# Patient Record
Sex: FEMALE | Race: BLACK OR AFRICAN AMERICAN | NOT HISPANIC OR LATINO
[De-identification: names, ages, dates, MRNs, and addresses within clinical notes are randomized per-mention and may not be internally consistent; named-entity substitution may affect disease eponyms.]

---

## 2017-03-31 PROBLEM — Z00.00 ENCOUNTER FOR PREVENTIVE HEALTH EXAMINATION: Status: ACTIVE | Noted: 2017-03-31

## 2018-07-09 ENCOUNTER — FORM ENCOUNTER (OUTPATIENT)
Age: 23
End: 2018-07-09

## 2018-07-10 ENCOUNTER — OUTPATIENT (OUTPATIENT)
Dept: OUTPATIENT SERVICES | Facility: HOSPITAL | Age: 23
LOS: 1 days | End: 2018-07-10
Payer: COMMERCIAL

## 2018-07-10 ENCOUNTER — APPOINTMENT (OUTPATIENT)
Dept: RADIOLOGY | Facility: CLINIC | Age: 23
End: 2018-07-10

## 2018-07-10 ENCOUNTER — TRANSCRIPTION ENCOUNTER (OUTPATIENT)
Age: 23
End: 2018-07-10

## 2018-07-10 ENCOUNTER — APPOINTMENT (OUTPATIENT)
Dept: ORTHOPEDIC SURGERY | Facility: CLINIC | Age: 23
End: 2018-07-10
Payer: MEDICAID

## 2018-07-10 VITALS — RESPIRATION RATE: 16 BRPM | BODY MASS INDEX: 44.39 KG/M2 | WEIGHT: 260 LBS | HEIGHT: 64 IN

## 2018-07-10 DIAGNOSIS — Z86.59 PERSONAL HISTORY OF OTHER MENTAL AND BEHAVIORAL DISORDERS: ICD-10-CM

## 2018-07-10 DIAGNOSIS — M25.561 PAIN IN RIGHT KNEE: ICD-10-CM

## 2018-07-10 PROCEDURE — 73564 X-RAY EXAM KNEE 4 OR MORE: CPT | Mod: 26,RT

## 2018-07-10 PROCEDURE — 99204 OFFICE O/P NEW MOD 45 MIN: CPT

## 2018-07-10 PROCEDURE — 73564 X-RAY EXAM KNEE 4 OR MORE: CPT

## 2018-07-10 RX ORDER — LOPERAMIDE HYDROCHLORIDE 1 MG/5ML
1 SOLUTION ORAL
Qty: 118 | Refills: 0 | Status: ACTIVE | COMMUNITY
Start: 2018-02-08

## 2018-07-10 RX ORDER — SILVER SULFADIAZINE 10 MG/G
1 CREAM TOPICAL
Qty: 50 | Refills: 0 | Status: ACTIVE | COMMUNITY
Start: 2018-02-08

## 2018-07-10 RX ORDER — TRIAMCINOLONE ACETONIDE 1 MG/G
0.1 CREAM TOPICAL
Qty: 80 | Refills: 0 | Status: ACTIVE | COMMUNITY
Start: 2018-02-13

## 2018-07-10 RX ORDER — FLUOCINOLONE ACETONIDE 0.1 MG/ML
0.01 SOLUTION TOPICAL
Qty: 60 | Refills: 0 | Status: ACTIVE | COMMUNITY
Start: 2018-02-13

## 2018-07-10 RX ORDER — KETOCONAZOLE 20 MG/G
2 CREAM TOPICAL
Qty: 60 | Refills: 0 | Status: ACTIVE | COMMUNITY
Start: 2018-06-25

## 2018-07-10 RX ORDER — BENZOYL PEROXIDE 5 G/100G
5 LIQUID TOPICAL
Qty: 227 | Refills: 0 | Status: ACTIVE | COMMUNITY
Start: 2018-06-28

## 2018-07-10 RX ORDER — ERYTHROMYCIN 20 MG/ML
2 SOLUTION TOPICAL
Qty: 60 | Refills: 0 | Status: ACTIVE | COMMUNITY
Start: 2018-06-28

## 2018-07-11 PROBLEM — Z86.59 HISTORY OF DEPRESSION: Status: RESOLVED | Noted: 2018-07-11 | Resolved: 2018-07-11

## 2018-07-11 PROBLEM — Z86.59 HISTORY OF ANXIETY: Status: RESOLVED | Noted: 2018-07-11 | Resolved: 2018-07-11

## 2018-07-12 ENCOUNTER — APPOINTMENT (OUTPATIENT)
Dept: ORTHOPEDIC SURGERY | Facility: CLINIC | Age: 23
End: 2018-07-12

## 2018-07-12 ENCOUNTER — TRANSCRIPTION ENCOUNTER (OUTPATIENT)
Age: 23
End: 2018-07-12

## 2018-07-13 PROBLEM — Z82.49 FAMILY HISTORY OF HYPERTENSION: Status: ACTIVE | Noted: 2018-07-13

## 2018-07-17 DIAGNOSIS — Z82.49 FAMILY HISTORY OF ISCHEMIC HEART DISEASE AND OTHER DISEASES OF THE CIRCULATORY SYSTEM: ICD-10-CM

## 2018-08-03 ENCOUNTER — OUTPATIENT (OUTPATIENT)
Dept: OUTPATIENT SERVICES | Facility: HOSPITAL | Age: 23
LOS: 1 days | Discharge: HOME | End: 2018-08-03
Payer: MEDICAID

## 2018-08-03 ENCOUNTER — APPOINTMENT (OUTPATIENT)
Dept: ORTHOPEDIC SURGERY | Facility: AMBULATORY SURGERY CENTER | Age: 23
End: 2018-08-03

## 2018-08-03 PROCEDURE — 29881 ARTHRS KNE SRG MNISECTMY M/L: CPT | Mod: RT

## 2018-08-03 PROCEDURE — 29879 ARTHRS KNE SRG ABRASJ ARTHRP: CPT | Mod: RT

## 2018-08-13 ENCOUNTER — APPOINTMENT (OUTPATIENT)
Dept: ORTHOPEDIC SURGERY | Facility: CLINIC | Age: 23
End: 2018-08-13
Payer: MEDICAID

## 2018-08-13 VITALS — HEIGHT: 64 IN | BODY MASS INDEX: 44.39 KG/M2 | WEIGHT: 260 LBS

## 2018-08-13 PROCEDURE — 99024 POSTOP FOLLOW-UP VISIT: CPT

## 2018-09-06 ENCOUNTER — APPOINTMENT (OUTPATIENT)
Dept: ORTHOPEDIC SURGERY | Facility: CLINIC | Age: 23
End: 2018-09-06
Payer: MEDICAID

## 2018-09-06 VITALS — HEIGHT: 64 IN | WEIGHT: 260 LBS | BODY MASS INDEX: 44.39 KG/M2

## 2018-09-06 PROCEDURE — 99024 POSTOP FOLLOW-UP VISIT: CPT

## 2018-09-06 RX ORDER — METHYLPREDNISOLONE 4 MG/1
4 TABLET ORAL
Qty: 21 | Refills: 0 | Status: DISCONTINUED | COMMUNITY
Start: 2018-06-30 | End: 2018-09-06

## 2018-09-06 RX ORDER — ONDANSETRON 4 MG/1
4 TABLET ORAL EVERY 8 HOURS
Qty: 4 | Refills: 0 | Status: DISCONTINUED | COMMUNITY
Start: 2018-08-02 | End: 2018-09-06

## 2018-09-06 RX ORDER — OXYCODONE AND ACETAMINOPHEN 5; 325 MG/1; MG/1
5-325 TABLET ORAL
Qty: 60 | Refills: 0 | Status: DISCONTINUED | COMMUNITY
Start: 2018-07-05 | End: 2018-09-06

## 2018-09-06 RX ORDER — ONDANSETRON 4 MG/1
4 TABLET, ORALLY DISINTEGRATING ORAL
Qty: 9 | Refills: 0 | Status: DISCONTINUED | COMMUNITY
Start: 2018-07-05 | End: 2018-09-06

## 2018-09-06 RX ORDER — SENNA 8.6 MG/1
8.6 TABLET, FILM COATED ORAL
Qty: 20 | Refills: 0 | Status: DISCONTINUED | COMMUNITY
Start: 2018-07-05 | End: 2018-09-06

## 2018-09-06 RX ORDER — AMOXICILLIN 500 MG/1
500 CAPSULE ORAL
Qty: 30 | Refills: 0 | Status: DISCONTINUED | COMMUNITY
Start: 2018-06-04 | End: 2018-09-06

## 2018-09-06 RX ORDER — KETOCONAZOLE 20.5 MG/ML
2 SHAMPOO, SUSPENSION TOPICAL
Qty: 120 | Refills: 0 | Status: DISCONTINUED | COMMUNITY
Start: 2018-02-13 | End: 2018-09-06

## 2018-09-06 RX ORDER — OXYCODONE AND ACETAMINOPHEN 5; 325 MG/1; MG/1
5-325 TABLET ORAL
Qty: 15 | Refills: 0 | Status: DISCONTINUED | COMMUNITY
Start: 2018-08-02 | End: 2018-09-06

## 2018-09-06 RX ORDER — AMOXICILLIN 250 MG/5ML
250 POWDER, FOR SUSPENSION ORAL
Qty: 300 | Refills: 0 | Status: DISCONTINUED | COMMUNITY
Start: 2018-02-08 | End: 2018-09-06

## 2019-03-21 ENCOUNTER — APPOINTMENT (OUTPATIENT)
Dept: ORTHOPEDIC SURGERY | Facility: CLINIC | Age: 24
End: 2019-03-21
Payer: MEDICAID

## 2019-03-21 VITALS — WEIGHT: 270 LBS | HEIGHT: 64 IN | BODY MASS INDEX: 46.1 KG/M2

## 2019-03-21 DIAGNOSIS — M23.8X1 OTHER INTERNAL DERANGEMENTS OF RIGHT KNEE: ICD-10-CM

## 2019-03-21 DIAGNOSIS — M21.061 VALGUS DEFORMITY, NOT ELSEWHERE CLASSIFIED, RIGHT KNEE: ICD-10-CM

## 2019-03-21 PROCEDURE — 99214 OFFICE O/P EST MOD 30 MIN: CPT

## 2019-03-22 PROBLEM — M23.8X1 CHONDRAL DEFECT OF CONDYLE OF RIGHT FEMUR: Status: ACTIVE | Noted: 2019-03-22

## 2019-03-22 PROBLEM — M21.061 VALGUS DEFORMITY, NOT ELSEWHERE CLASSIFIED, RIGHT KNEE: Status: ACTIVE | Noted: 2019-03-22

## 2019-03-22 NOTE — HISTORY OF PRESENT ILLNESS
[de-identified] : DOS: 8-3-18\par \par 23 year old female presenting 7 months s/p right knee arthroscopic partial lateral meniscectomy and chondroplasty. She went to [facility] where she was doing physical therapy. She was improving, her sense of stability and pain was improving with physical therapy. She stopped going to PT because of insurance issues and since then the pain and feelings of giving way returned. She would like to get back to physical therapy to strengthen her quads.

## 2019-03-22 NOTE — DISCUSSION/SUMMARY
[de-identified] : Assessment: 24F with valgus alignment, lateral meniscal deficiency, and chondral lesion of the lateral femoral condyle\par \par Plan:\par \par PT perscription was renewed for patient. \par \par We again discussed the significance of her injury. I do not believe that PT alone will resolve her pain and I explained to her that, left as is, her knee will likely continue to wear with possible development of early arthritis. Ideally, treatment for this problems would likely be distal femoral osteotomy based upon mechanical alignment films, osteochondral allograft to the lateral femoral condyle, and lateral meniscal transplantation. \par \par Another option would conceivably be isolated distal femoral osteotomy with staged lateral compartment procedures if she continues to have pain. \par \par We discussed both of these options at length with her. \par \par We also stressed the importance of weight loss. \par \par The patient appears to have reasonable understanding of the implications of her cartilage lesion and I have offered her a second opinion should she like. \par \par The patient would like to consider her options and I will see her back in 6 weeks for reevaluation.

## 2019-03-22 NOTE — PHYSICAL EXAM
[de-identified] : \par General: Obese female, resting comfortably on exam table, Patient is awake and alert, demonstrates appropriate mood and affect, exhibits normal breathing \par Constitutional: no acute distress.\par Skin: The skin is intact, warm, pink, and dry over the area examined.\par Lymph: There is no lymphedema except as noted below. \par Cardiovascular: There is brisk capillary refill in the digits of the affected extremity. They are symmetric pulses in the bilateral upper and lower extremities. \par Respiratory: The patient is in no apparent respiratory distress. They're taking full deep breaths without use of accessory muscles or evidence of audible wheezes or stridor without the use of a stethoscope. \par \par Right knee:\par minimal effusion\par Incisions well healed\par valgus alignment\par ttp at distal pole of the patella\par Range of motion: 0-120 symmetric to contralateral \par + ttp at lateral femoral condyle\par no calf swelling or ttp\par EHL/FHL/TA/Gs intact\par S/S/T/SPN/DPN intact to light touch\par Brisk capillary refill to all digits.

## 2019-03-22 NOTE — PHYSICAL EXAM
[de-identified] : \par General: Obese female, resting comfortably on exam table, Patient is awake and alert, demonstrates appropriate mood and affect, exhibits normal breathing \par Constitutional: no acute distress.\par Skin: The skin is intact, warm, pink, and dry over the area examined.\par Lymph: There is no lymphedema except as noted below. \par Cardiovascular: There is brisk capillary refill in the digits of the affected extremity. They are symmetric pulses in the bilateral upper and lower extremities. \par Respiratory: The patient is in no apparent respiratory distress. They're taking full deep breaths without use of accessory muscles or evidence of audible wheezes or stridor without the use of a stethoscope. \par \par Right knee:\par minimal effusion\par Incisions well healed\par valgus alignment\par ttp at distal pole of the patella\par Range of motion: 0-120 symmetric to contralateral \par + ttp at lateral femoral condyle\par no calf swelling or ttp\par EHL/FHL/TA/Gs intact\par S/S/T/SPN/DPN intact to light touch\par Brisk capillary refill to all digits.

## 2019-03-22 NOTE — HISTORY OF PRESENT ILLNESS
[de-identified] : DOS: 8-3-18\par \par 23 year old female presenting 7 months s/p right knee arthroscopic partial lateral meniscectomy and chondroplasty. She went to [place] where she was doing physical therapy. She was improving, her sense of stability and pain was improving with physical therapy. She stopped going to PT because of insurance issues and since then the pain and feelings of giving way returned. She would like to get back to physical therapy to strengthen her quads.

## 2019-03-22 NOTE — DISCUSSION/SUMMARY
[de-identified] : Assessment: 24F with valgus alignment, lateral meniscal deficiency, and chondral lesion of the lateral femoral condyle\par \par Plan:\par \par PT perscription was renewed for patient. \par \par We again discussed the significance of her injury. I do not believe that PT alone will resolve her pain and I explained to her that, left as is, her knee will likely continue to wear with possible development of early arthritis. Ideally, treatment for this problems would likely be distal femoral osteotomy based upon mechanical alignment films, osteochondral allograft to the lateral femoral condyle, and lateral meniscal transplantation. \par \par Another option would conceivably be isolated distal femoral osteotomy with staged lateral compartment procedures if she continues to have pain. \par \par We discussed both of these options at length with her. \par \par We also stressed the importance of weight loss. \par \par The patient appears to have reasonable understanding of the implications of her cartilage lesion and I have offered her a second opinion should she like. \par \par The patient would like to consider her options and I will see her back in 6 weeks for reevaluation.

## 2019-03-30 ENCOUNTER — TRANSCRIPTION ENCOUNTER (OUTPATIENT)
Age: 24
End: 2019-03-30

## 2019-04-18 ENCOUNTER — MESSAGE (OUTPATIENT)
Age: 24
End: 2019-04-18

## 2019-04-24 ENCOUNTER — MESSAGE (OUTPATIENT)
Age: 24
End: 2019-04-24

## 2019-05-03 ENCOUNTER — CLINICAL ADVICE (OUTPATIENT)
Age: 24
End: 2019-05-03

## 2019-05-03 ENCOUNTER — TRANSCRIPTION ENCOUNTER (OUTPATIENT)
Age: 24
End: 2019-05-03

## 2019-05-06 ENCOUNTER — TRANSCRIPTION ENCOUNTER (OUTPATIENT)
Age: 24
End: 2019-05-06

## 2019-05-07 ENCOUNTER — TRANSCRIPTION ENCOUNTER (OUTPATIENT)
Age: 24
End: 2019-05-07

## 2019-05-08 ENCOUNTER — TRANSCRIPTION ENCOUNTER (OUTPATIENT)
Age: 24
End: 2019-05-08

## 2019-08-02 ENCOUNTER — APPOINTMENT (OUTPATIENT)
Dept: ORTHOPEDIC SURGERY | Facility: CLINIC | Age: 24
End: 2019-08-02

## 2023-10-28 ENCOUNTER — NON-APPOINTMENT (OUTPATIENT)
Age: 28
End: 2023-10-28

## 2023-12-06 ENCOUNTER — APPOINTMENT (OUTPATIENT)
Dept: ORTHOPEDIC SURGERY | Facility: CLINIC | Age: 28
End: 2023-12-06
Payer: MEDICAID

## 2023-12-06 VITALS — WEIGHT: 270 LBS | HEIGHT: 64 IN | BODY MASS INDEX: 46.1 KG/M2

## 2023-12-06 DIAGNOSIS — S62.629A DISPLACED FX  OF MID PHALANX OF UNSPECIFIED FINGER,INITIAL ENC.CLOSED FX: ICD-10-CM

## 2023-12-06 PROCEDURE — 99203 OFFICE O/P NEW LOW 30 MIN: CPT

## 2023-12-07 ENCOUNTER — APPOINTMENT (OUTPATIENT)
Dept: ORTHOPEDIC SURGERY | Facility: CLINIC | Age: 28
End: 2023-12-07
Payer: MEDICAID

## 2023-12-07 VITALS — WEIGHT: 250 LBS | HEIGHT: 64 IN | BODY MASS INDEX: 42.68 KG/M2

## 2023-12-07 PROCEDURE — 99214 OFFICE O/P EST MOD 30 MIN: CPT

## 2023-12-07 PROCEDURE — 73562 X-RAY EXAM OF KNEE 3: CPT | Mod: RT

## 2023-12-18 ENCOUNTER — NON-APPOINTMENT (OUTPATIENT)
Age: 28
End: 2023-12-18

## 2024-03-15 ENCOUNTER — APPOINTMENT (OUTPATIENT)
Dept: ORTHOPEDIC SURGERY | Facility: CLINIC | Age: 29
End: 2024-03-15
Payer: MEDICAID

## 2024-03-15 VITALS — WEIGHT: 250 LBS | HEIGHT: 64 IN | BODY MASS INDEX: 42.68 KG/M2

## 2024-03-15 DIAGNOSIS — S83.281A OTHER TEAR OF LATERAL MENISCUS, CURRENT INJURY, RIGHT KNEE, INITIAL ENCOUNTER: ICD-10-CM

## 2024-03-15 DIAGNOSIS — M17.11 UNILATERAL PRIMARY OSTEOARTHRITIS, RIGHT KNEE: ICD-10-CM

## 2024-03-15 DIAGNOSIS — M21.069 VALGUS DEFORMITY, NOT ELSEWHERE CLASSIFIED, UNSPECIFIED KNEE: ICD-10-CM

## 2024-03-15 PROCEDURE — 99214 OFFICE O/P EST MOD 30 MIN: CPT

## 2024-03-15 NOTE — HISTORY OF PRESENT ILLNESS
[6] : 6 [0] : 0 [Sharp] : sharp [Frequent] : frequent [Meds] : meds [Physical therapy] : physical therapy [Standing] : standing [de-identified] : This is Ms. GADIEL GALINDO  a 28 year old female who comes in today complaining of right knee pain.  She has a hx of outside knee surgery and saw Dr Almanza in 2019.  Loreta noticed return in pain after she stopped working on stregthening [] : no [de-identified] : none

## 2024-03-15 NOTE — DISCUSSION/SUMMARY
[de-identified] : Recommend Patient f/u with specialist to further discuss possible distal femoral osteotomy and +/- possible allograft meniscus, cartilage procedure Discussed recommended weight loss and conservative treatment as well however if pain persistent above procedure may be indicated for pain relief and penny of arthroplasty. Discussed natural history of her knee would be arthroplasty  Patient wishes to continue conservative tx at this time Plan for PT and Rx: varus  brace  New PT rx ----------------------------------------------------------------------------  Due to patient BMI, a weight loss discussion was had with the patient. Patient states understanding.  ----------------------------------------------------------------------------  All relevant imaging studies pertinent to today's visit, including x-rays, MRI's and/or other advanced imaging studies (CT/etc) were independently interpreted and reviewed with the patient as needed. Implications of the studies together with the patient's clinical picture were discussed to formulate a working diagnosis and management options were detailed.  The patient was advised of the diagnosis.  The natural history of the pathology was explained in full. All questions were answered.  The risks and benefits of conservative and interventional treatment alternatives were explained to the patient    Progress note completed by Mohinder Kerns PA-C working as a scribe for Dr Almanza

## 2024-03-15 NOTE — REASON FOR VISIT
[FreeTextEntry2] : follow up right knee. notes sx improvement with PT since last visit. notes less buckling episodes. did not get the brace.

## 2024-03-15 NOTE — IMAGING
[Right] : right knee [All Views] : anteroposterior, lateral, skyline, and anteroposterior standing [Moderate tricompartmental OA medial narrowing] : Moderate tricompartmental OA medial narrowing [de-identified] :   ----------------------------------------------------------------------------   Right knee exam:  increased body habitus Skin: no significant pertinent finding  Inspection: valgus aignment     (neg) Effusion     (neg) Malalignment     (neg) Swelling     (neg) Quad atrophy     (neg) J-sign  ROM:     0 - 135 degrees of flexion.  Tenderness:     (+) MJLT     (neg) LJLT     (neg) Medial patellar facet tenderness     (neg) Lateral patellar facet tenderness     (neg) Crepitus     (neg) Patellar grind tenderness     (neg) Patellar tendon     (neg) Quad tendon     Other:   Stability:     (neg) Lachman     (+) Varus/Valgus instability     (neg) Posterior drawer     (neg) Patellar translation: wnl  Additional tests:     (=) McMurrays test     (neg) Patellar apprehension     Other:   Strength: 5/5 Q/H/TA/GS/EHL  Neuro: In tact to light touch throughout, DTR's wnl  Vascularity: Extremity warm and well perfused  Gait: normal

## 2024-03-27 ENCOUNTER — APPOINTMENT (OUTPATIENT)
Dept: ORTHOPEDIC SURGERY | Facility: CLINIC | Age: 29
End: 2024-03-27
Payer: MEDICAID

## 2024-03-27 DIAGNOSIS — M25.642 STIFFNESS OF LEFT HAND, NOT ELSEWHERE CLASSIFIED: ICD-10-CM

## 2024-03-27 DIAGNOSIS — M21.242: ICD-10-CM

## 2024-03-27 PROCEDURE — 99213 OFFICE O/P EST LOW 20 MIN: CPT

## 2024-03-27 NOTE — HISTORY OF PRESENT ILLNESS
[de-identified] : 3/27/24: f/u LEFT small finger flexion contracture, stiffness. Attended OT @[facility]. Reports 50% improvement in pain. Also reports improvement in ROM. Has more equipment for HEP.  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 Reports that it was struck by a volleyball in [place] in September 2022, had XR of L small finger in October 2022, underwent surgery in February 2023. She does not have prior office notes or operative report for review. She reports that she underwent surgery for PIPJ flexion deformity (unclear if flexion contracture or extensor lag). She states that a "tendon was cut and stitched" and a pin was placed and subsequently removed. Reports that she was instructed to perform a HEP post-operatively, but she did not perform it daily. Reports that she saw another physician in [place], who recommended continued non-operative treatment. Reports that she attended ~20 sessions of therapy in [place] in August 2023. She recently moved back to the [place], but is unsure how long she'll be here. Reports that she was in [place] for 4 years for her 's work. c/o inability to straighten finger and weakness.   Hx: Anxiety. Depression. Sx: R knee arthroscopic PLM + chondroplasty. [FreeTextEntry5] : Follow Up- [facility]. Doing well since previous visit. Slight pain in finger.

## 2024-03-27 NOTE — IMAGING
[de-identified] : LEFT HAND SF: midaxial scar along middle phalanx. skin intact. mild swelling of SF. no TTP. SF: PIPJ 30deg flex contracture, otherwise good extension. flex to mid-palm. good flex/ext other digits.  SILT to median, ulnar, radial distribution.  brisk cap refill all digits. no triggering.

## 2024-03-27 NOTE — ASSESSMENT
[FreeTextEntry1] : - patient requesting additional OT, Rx provided. patient also has HEP. - activity as tolerated.  F/u PRN.

## 2024-05-17 ENCOUNTER — APPOINTMENT (OUTPATIENT)
Dept: ORTHOPEDIC SURGERY | Facility: CLINIC | Age: 29
End: 2024-05-17

## 2024-07-30 ENCOUNTER — APPOINTMENT (OUTPATIENT)
Dept: ORTHOPEDIC SURGERY | Facility: CLINIC | Age: 29
End: 2024-07-30
Payer: MEDICAID

## 2024-07-30 DIAGNOSIS — M21.069 VALGUS DEFORMITY, NOT ELSEWHERE CLASSIFIED, UNSPECIFIED KNEE: ICD-10-CM

## 2024-07-30 DIAGNOSIS — M17.11 UNILATERAL PRIMARY OSTEOARTHRITIS, RIGHT KNEE: ICD-10-CM

## 2024-07-30 DIAGNOSIS — S83.281A OTHER TEAR OF LATERAL MENISCUS, CURRENT INJURY, RIGHT KNEE, INITIAL ENCOUNTER: ICD-10-CM

## 2024-07-30 PROCEDURE — 99214 OFFICE O/P EST MOD 30 MIN: CPT

## 2024-07-30 NOTE — HISTORY OF PRESENT ILLNESS
[6] : 6 [0] : 0 [Sharp] : sharp [Frequent] : frequent [Meds] : meds [Physical therapy] : physical therapy [Standing] : standing [de-identified] : This is Ms. GADIEL GALINDO  a 28 year old female who comes in today complaining of right knee pain.  She has a hx of outside knee surgery and saw Dr Almanza in 2019.  Loreta noticed return in pain after she stopped working on stregthening   [] : no [de-identified] : none

## 2024-07-30 NOTE — REASON FOR VISIT
[FreeTextEntry2] : follow up right knee. c/o recent acute episode. Had locked episode approx 2-3 wks ago, was unable to straighten knee. states end of the day pain flares up. has a slight bump / swelling on R knee. Also having new buckling sx.

## 2024-07-30 NOTE — IMAGING
[Right] : right knee [All Views] : anteroposterior, lateral, skyline, and anteroposterior standing [Moderate tricompartmental OA medial narrowing] : Moderate tricompartmental OA medial narrowing [de-identified] :   ----------------------------------------------------------------------------   Right knee exam:  increased body habitus Skin: no significant pertinent finding  Inspection: valgus aignment     (+) Effusion     (neg) Malalignment     () Swelling     (neg) Quad atrophy     (neg) J-sign  ROM:     2 - 115 degrees of flexion.  Tenderness:     (+) MJLT     (+) LJLT     (neg) Medial patellar facet tenderness     (neg) Lateral patellar facet tenderness     (+) Crepitus     (neg) Patellar grind tenderness     (neg) Patellar tendon     (neg) Quad tendon     Other:   Stability:     (neg) Lachman     (+) Varus/Valgus instability     (neg) Posterior drawer     (neg) Patellar translation: wnl  Additional tests:     (+) McMurrays test     (neg) Patellar apprehension     Other:   Strength: 5/5 Q/H/TA/GS/EHL  Neuro: In tact to light touch throughout, DTR's wnl  Vascularity: Extremity warm and well perfused  Gait: min antalgic

## 2024-07-30 NOTE — DISCUSSION/SUMMARY
[de-identified] : Indicated for updated MRI to eval for progression of pathology given recent exacerbation  Continue PT as tolerated fu p MRI    ----------------------------------------------------------------------------  Due to patient BMI, a weight loss discussion was had with the patient. Patient states understanding.   ----------------------------------------------------------------------------    All relevant imaging studies pertinent to today's visit, including x-rays, MRI's and/or other advanced imaging studies (CT/etc) were independently interpreted and reviewed with the patient as needed. Implications of the studies together with the patient's clinical picture were discussed to formulate a working diagnosis and management options were detailed.   The patient and/or guardian was advised of the diagnosis. The natural history of the pathology was explained in full. All questions were answered. The risks and benefits of conservative and interventional treatment alternatives were explained to the patient  The patient and/or guardian was advised if any advanced diagnostic/imaging study (MRI/CT/etc) is ordered to evaluate potential pathology in the affected area(s), they should follow up in the office to review the results of the study and determine further management that may be indicated.     Progress note completed by Mohinder Kerns PA-C working as a scribe for Dr Almanza

## 2024-08-09 ENCOUNTER — APPOINTMENT (OUTPATIENT)
Dept: MRI IMAGING | Facility: CLINIC | Age: 29
End: 2024-08-09

## 2024-08-09 PROCEDURE — 73721 MRI JNT OF LWR EXTRE W/O DYE: CPT | Mod: RT

## 2024-08-14 ENCOUNTER — APPOINTMENT (OUTPATIENT)
Dept: ORTHOPEDIC SURGERY | Facility: CLINIC | Age: 29
End: 2024-08-14

## 2024-08-14 VITALS — HEIGHT: 64 IN | BODY MASS INDEX: 42.68 KG/M2 | WEIGHT: 250 LBS

## 2024-08-14 DIAGNOSIS — S83.281A OTHER TEAR OF LATERAL MENISCUS, CURRENT INJURY, RIGHT KNEE, INITIAL ENCOUNTER: ICD-10-CM

## 2024-08-14 DIAGNOSIS — M21.069 VALGUS DEFORMITY, NOT ELSEWHERE CLASSIFIED, UNSPECIFIED KNEE: ICD-10-CM

## 2024-08-14 DIAGNOSIS — M17.11 UNILATERAL PRIMARY OSTEOARTHRITIS, RIGHT KNEE: ICD-10-CM

## 2024-08-14 PROCEDURE — 99214 OFFICE O/P EST MOD 30 MIN: CPT | Mod: 25

## 2024-08-14 PROCEDURE — 20610 DRAIN/INJ JOINT/BURSA W/O US: CPT

## 2024-08-14 NOTE — HISTORY OF PRESENT ILLNESS
[7] : 7 [0] : 0 [Sharp] : sharp [Frequent] : frequent [Meds] : meds [Physical therapy] : physical therapy [Standing] : standing [] : yes

## 2024-08-27 ENCOUNTER — APPOINTMENT (OUTPATIENT)
Dept: ORTHOPEDIC SURGERY | Facility: CLINIC | Age: 29
End: 2024-08-27
Payer: MEDICAID

## 2024-08-27 DIAGNOSIS — M17.11 UNILATERAL PRIMARY OSTEOARTHRITIS, RIGHT KNEE: ICD-10-CM

## 2024-08-27 DIAGNOSIS — M21.069 VALGUS DEFORMITY, NOT ELSEWHERE CLASSIFIED, UNSPECIFIED KNEE: ICD-10-CM

## 2024-08-27 PROCEDURE — 99203 OFFICE O/P NEW LOW 30 MIN: CPT

## 2024-08-27 NOTE — DATA REVIEWED
[FreeTextEntry1] : 8/9/2024 MRI of right knee ( shashank Chairez).  Lateral meniscal tear cartilage loss most noted over lateral joint with reactive marrow edema and joint effusion lateral subluxation of the patella.

## 2024-08-27 NOTE — IMAGING
[de-identified] : RIGHT KNEE  Inspection:  mild effusion  Palpation: medial joint line tenderness, anterior tenderness  Knee Range of Motion:  3-125   Strength: 5/5 Quadriceps strength, 5/5 Hamstring strength  Neurological: light touch is intact throughout  Ligament Stability and Special Tests:   McMurrays: neg  Lachman: neg  Pivot Shift: neg  Posterior Drawer: neg  Valgus: neg  Varus: neg  Patella Apprehension: neg  Patella Maltracking: neg

## 2024-08-27 NOTE — HISTORY OF PRESENT ILLNESS
[de-identified] : 08/27/2024:  GADIEL GALINDO is a 29 year y/o F, PMHx obese, anxiety, depression, here for evaluation of their right knee pain since she was 16 y/o, worsening in past 1 year. insidious onset and progressively worsening.  Pain indicated to anterior aspect of right knee, 8/10, constant, achy and sharp at time. Worsening with prolonged walking, stairs, standing to point affecting quality of life. 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 This is NOT an athletic injury occurring associated with an interscholastic or organized sports team. Quality of symptoms:  discomfort  Improves with: physical therapy and brace  Worse with: laying down  Prior treatment: is doing physical therapy, wearing brace has done injection 30 days ago  Prior Imaging:  MRI  Reports Available For Review Today: Out of work/sport: none  School/Sport/Position/Occupation:  Personal goal: Additional Information: This is Ms. GADIEL GALINDO a 28 year old female who comes in today complaining of right knee pain. She has a hx of outside knee surgery and saw Dr Almanza in 2019. Patient noticed return in pain after she stopped working on strengthening  [FreeTextEntry1] : right knee

## 2024-08-27 NOTE — DISCUSSION/SUMMARY
[de-identified] : R knee LMT and Arthrosis lateral compartment discussed with pt that due to her bmi she is not currently a candidate for correctional osteotomy would recommend Mobic,  brace, HA injections, PT and weight loss  d/w pt that goal would be 200lbs or BMI ~30 RTC 3 months to assess shes currently 255lbs  next visit: order long leg alignment films

## 2025-01-27 ENCOUNTER — NON-APPOINTMENT (OUTPATIENT)
Age: 30
End: 2025-01-27

## 2025-02-04 ENCOUNTER — APPOINTMENT (OUTPATIENT)
Dept: ORTHOPEDIC SURGERY | Facility: CLINIC | Age: 30
End: 2025-02-04
Payer: MEDICAID

## 2025-02-04 DIAGNOSIS — M22.42 CHONDROMALACIA PATELLAE, LEFT KNEE: ICD-10-CM

## 2025-02-04 DIAGNOSIS — M17.12 UNILATERAL PRIMARY OSTEOARTHRITIS, LEFT KNEE: ICD-10-CM

## 2025-02-04 DIAGNOSIS — M17.11 UNILATERAL PRIMARY OSTEOARTHRITIS, RIGHT KNEE: ICD-10-CM

## 2025-02-04 PROCEDURE — 73564 X-RAY EXAM KNEE 4 OR MORE: CPT | Mod: LT

## 2025-02-04 PROCEDURE — 99214 OFFICE O/P EST MOD 30 MIN: CPT

## 2025-02-04 RX ORDER — DICLOFENAC SODIUM 25 MG/1
25 TABLET, DELAYED RELEASE ORAL
Qty: 60 | Refills: 0 | Status: ACTIVE | COMMUNITY
Start: 2025-02-04 | End: 1900-01-01

## 2025-04-01 ENCOUNTER — APPOINTMENT (OUTPATIENT)
Dept: ORTHOPEDIC SURGERY | Facility: CLINIC | Age: 30
End: 2025-04-01

## 2025-04-15 ENCOUNTER — APPOINTMENT (OUTPATIENT)
Dept: ORTHOPEDIC SURGERY | Facility: CLINIC | Age: 30
End: 2025-04-15
Payer: MEDICAID

## 2025-04-15 DIAGNOSIS — M67.813 OTHER SPECIFIED DISORDERS OF TENDON, RIGHT SHOULDER: ICD-10-CM

## 2025-04-15 DIAGNOSIS — M75.81 OTHER SHOULDER LESIONS, RIGHT SHOULDER: ICD-10-CM

## 2025-04-15 DIAGNOSIS — M75.41 IMPINGEMENT SYNDROME OF RIGHT SHOULDER: ICD-10-CM

## 2025-04-15 PROCEDURE — 73010 X-RAY EXAM OF SHOULDER BLADE: CPT | Mod: RT

## 2025-04-15 PROCEDURE — 99214 OFFICE O/P EST MOD 30 MIN: CPT

## 2025-04-15 PROCEDURE — 73030 X-RAY EXAM OF SHOULDER: CPT | Mod: RT

## 2025-04-30 ENCOUNTER — APPOINTMENT (OUTPATIENT)
Dept: MRI IMAGING | Facility: CLINIC | Age: 30
End: 2025-04-30
Payer: MEDICAID

## 2025-04-30 PROCEDURE — 73221 MRI JOINT UPR EXTREM W/O DYE: CPT | Mod: RT

## 2025-05-06 ENCOUNTER — APPOINTMENT (OUTPATIENT)
Dept: ORTHOPEDIC SURGERY | Facility: CLINIC | Age: 30
End: 2025-05-06
Payer: MEDICAID

## 2025-05-06 DIAGNOSIS — M75.41 IMPINGEMENT SYNDROME OF RIGHT SHOULDER: ICD-10-CM

## 2025-05-06 DIAGNOSIS — M67.813 OTHER SPECIFIED DISORDERS OF TENDON, RIGHT SHOULDER: ICD-10-CM

## 2025-05-06 PROCEDURE — 99214 OFFICE O/P EST MOD 30 MIN: CPT

## 2025-05-06 RX ORDER — DICLOFENAC SODIUM 25 MG/1
25 TABLET, DELAYED RELEASE ORAL
Qty: 60 | Refills: 0 | Status: ACTIVE | COMMUNITY
Start: 2025-05-06 | End: 1900-01-01

## 2025-06-16 ENCOUNTER — APPOINTMENT (OUTPATIENT)
Dept: ORTHOPEDIC SURGERY | Facility: CLINIC | Age: 30
End: 2025-06-16
Payer: MEDICAID

## 2025-06-16 PROCEDURE — 99214 OFFICE O/P EST MOD 30 MIN: CPT

## 2025-09-18 ENCOUNTER — APPOINTMENT (OUTPATIENT)
Dept: ORTHOPEDIC SURGERY | Facility: CLINIC | Age: 30
End: 2025-09-18
Payer: MEDICAID

## 2025-09-18 DIAGNOSIS — S63.653A SPRAIN OF METACARPOPHALANGEAL JOINT OF LEFT MIDDLE FINGER, INITIAL ENCOUNTER: ICD-10-CM

## 2025-09-18 PROCEDURE — 99213 OFFICE O/P EST LOW 20 MIN: CPT
